# Patient Record
Sex: FEMALE | Race: WHITE | URBAN - METROPOLITAN AREA
[De-identification: names, ages, dates, MRNs, and addresses within clinical notes are randomized per-mention and may not be internally consistent; named-entity substitution may affect disease eponyms.]

---
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
## 2020-02-14 PROBLEM — Z78.9 BREASTFEEDING (INFANT): Status: RESOLVED | Noted: 2018-06-25 | Resolved: 2020-02-14

## 2020-02-14 PROBLEM — Z97.5 IUD (INTRAUTERINE DEVICE) IN PLACE: Status: ACTIVE | Noted: 2020-02-14

## 2020-02-14 NOTE — PROGRESS NOTES
[address]  PROGRESS NOTE  Chief Complaint:   Patient presents with: Follow - Up: Medication      HPI:   This is a 21year old female coming in for follow-up on her medication.   She ran out of SeroLoot!l in May of last year and has not ta given: Not Answered       REVIEW OF SYSTEMS:   CONSTITUTIONAL: See HPI. She is not breast-feeding now. She does have an IUD in place. EENT:  Eyes:  Denies eye pain, visual loss, blurred vision, double vision or yellow sclerae.  Ears, Nose, Throat:  Mah[place] Para Supple, no CLAD, no JVD, no thyromegaly. SKIN: No rashes, no skin lesion, no bruising, good turgor. HEART:  Regular rate and rhythm, no murmurs, rubs or gallops. LUNGS: Clear to auscultation bilterally, no rales/rhonchi/wheezing.     ASSESSMENT AND PLAN:

## 2021-02-01 ENCOUNTER — TELEPHONE (OUTPATIENT)
Dept: FAMILY MEDICINE CLINIC | Facility: CLINIC | Age: 21
End: 2021-02-01

## 2021-02-01 NOTE — TELEPHONE ENCOUNTER
Ts-Yes to runny nose. No other symptoms or contact. Appt ok.     Your appointments     Date & Time Appointment Department [facility])    Feb 02, 2021  3:00 PM CST Exam - Established with Shanon Sanchez MD [address] [place] [address] [facility]

## 2021-02-02 NOTE — PROGRESS NOTES
[facility] [place]  PROGRESS NOTE  Chief Complaint:   Patient presents with:  Depression: medication is not helping with depression, depression has increased since last visit      HPI:   This is a 24year old female coming in for depression.   Sh history. Allergies:  No Known Allergies  Current Meds:  Current Outpatient Medications   Medication Sig Dispense Refill   • divalproex Sodium  MG Oral Tablet 24 Hr Take 1 tablet (250 mg total) by mouth 2 (two) times daily.  60 tablet 5      Counselin groomed. Physical Exam:  GEN:  Patient is alert, awake and oriented, well developed, well nourished, no apparent distress.   HEENT:  Head:  Normocephalic, atraumatic Eyes: EOMI, PERRLA, no scleral icterus, conjunctivae clear bilaterally, no eye discharge E understanding. Patient is notified to call with any questions, complications, allergies, or worsening or changing symptoms. Patient is to call with any side effects or complications from the treatments as a result of today.      Problem List:  Patient Acti

## 2021-04-26 ENCOUNTER — TELEPHONE (OUTPATIENT)
Dept: FAMILY MEDICINE CLINIC | Facility: CLINIC | Age: 21
End: 2021-04-26

## 2021-04-26 NOTE — TELEPHONE ENCOUNTER
Patient states she has already made an appointment at   Urgent Care. She will have her finger looked at there.   Torrie Hart, 04/26/21, 9:19 AM

## 2021-04-26 NOTE — TELEPHONE ENCOUNTER
pt has fake nails on, one of them bent back when she was doing something and it ripped off her own nail- still has some of it on but is afraid to take the rest off - wants to know if  can take care of this

## 2021-08-25 ENCOUNTER — LAB ENCOUNTER (OUTPATIENT)
Dept: LAB | Age: 21
End: 2021-08-25
Attending: NURSE PRACTITIONER
Payer: COMMERCIAL

## 2021-08-25 ENCOUNTER — TELEPHONE (OUTPATIENT)
Dept: FAMILY MEDICINE CLINIC | Facility: CLINIC | Age: 21
End: 2021-08-25

## 2021-08-25 ENCOUNTER — OFFICE VISIT (OUTPATIENT)
Dept: FAMILY MEDICINE CLINIC | Facility: CLINIC | Age: 21
End: 2021-08-25
Payer: COMMERCIAL

## 2021-08-25 VITALS
WEIGHT: 164.81 LBS | DIASTOLIC BLOOD PRESSURE: 80 MMHG | HEIGHT: 64.5 IN | SYSTOLIC BLOOD PRESSURE: 124 MMHG | BODY MASS INDEX: 27.8 KG/M2 | RESPIRATION RATE: 16 BRPM | TEMPERATURE: 98 F | OXYGEN SATURATION: 98 % | HEART RATE: 65 BPM

## 2021-08-25 DIAGNOSIS — R10.32 LEFT LOWER QUADRANT PAIN: ICD-10-CM

## 2021-08-25 DIAGNOSIS — R30.0 BURNING WITH URINATION: Primary | ICD-10-CM

## 2021-08-25 DIAGNOSIS — N92.6 ABNORMAL MENSES: ICD-10-CM

## 2021-08-25 PROBLEM — Z97.5 IUD (INTRAUTERINE DEVICE) IN PLACE: Status: RESOLVED | Noted: 2020-02-14 | Resolved: 2021-08-25

## 2021-08-25 LAB
ALBUMIN SERPL-MCNC: 4.2 G/DL (ref 3.4–5)
ALBUMIN/GLOB SERPL: 1.2 {RATIO} (ref 1–2)
ALP LIVER SERPL-CCNC: 55 U/L
ALT SERPL-CCNC: 23 U/L
ANION GAP SERPL CALC-SCNC: 7 MMOL/L (ref 0–18)
APPEARANCE: CLEAR
AST SERPL-CCNC: 12 U/L (ref 15–37)
BASOPHILS # BLD AUTO: 0.04 X10(3) UL (ref 0–0.2)
BASOPHILS NFR BLD AUTO: 0.7 %
BILIRUB SERPL-MCNC: 0.6 MG/DL (ref 0.1–2)
BILIRUB UR QL STRIP.AUTO: NEGATIVE
BILIRUBIN: NEGATIVE
BUN BLD-MCNC: 9 MG/DL (ref 7–18)
CALCIUM BLD-MCNC: 8.9 MG/DL (ref 8.5–10.1)
CHLORIDE SERPL-SCNC: 106 MMOL/L (ref 98–112)
CLARITY UR REFRACT.AUTO: CLEAR
CO2 SERPL-SCNC: 25 MMOL/L (ref 21–32)
COLOR UR AUTO: YELLOW
CREAT BLD-MCNC: 0.61 MG/DL
CUVETTE LOT #: NORMAL NUMERIC
EOSINOPHIL # BLD AUTO: 0.13 X10(3) UL (ref 0–0.7)
EOSINOPHIL NFR BLD AUTO: 2.3 %
ERYTHROCYTE [DISTWIDTH] IN BLOOD BY AUTOMATED COUNT: 14 %
GLOBULIN PLAS-MCNC: 3.4 G/DL (ref 2.8–4.4)
GLUCOSE (URINE DIPSTICK): NEGATIVE MG/DL
GLUCOSE BLD-MCNC: 75 MG/DL (ref 70–99)
GLUCOSE UR STRIP.AUTO-MCNC: NEGATIVE MG/DL
HCT VFR BLD AUTO: 43.9 %
HEMOGLOBIN: 14 G/DL (ref 12–15)
HGB BLD-MCNC: 14.1 G/DL
IMM GRANULOCYTES # BLD AUTO: 0.01 X10(3) UL (ref 0–1)
IMM GRANULOCYTES NFR BLD: 0.2 %
KETONES (URINE DIPSTICK): NEGATIVE MG/DL
KETONES UR STRIP.AUTO-MCNC: NEGATIVE MG/DL
LEUKOCYTE ESTERASE UR QL STRIP.AUTO: NEGATIVE
LEUKOCYTES: NEGATIVE
LYMPHOCYTES # BLD AUTO: 1.89 X10(3) UL (ref 1–4)
LYMPHOCYTES NFR BLD AUTO: 33.7 %
M PROTEIN MFR SERPL ELPH: 7.6 G/DL (ref 6.4–8.2)
MCH RBC QN AUTO: 29.4 PG (ref 26–34)
MCHC RBC AUTO-ENTMCNC: 32.1 G/DL (ref 31–37)
MCV RBC AUTO: 91.5 FL
MONOCYTES # BLD AUTO: 0.36 X10(3) UL (ref 0.1–1)
MONOCYTES NFR BLD AUTO: 6.4 %
MULTISTIX LOT#: 5077 NUMERIC
NEUTROPHILS # BLD AUTO: 3.18 X10 (3) UL (ref 1.5–7.7)
NEUTROPHILS # BLD AUTO: 3.18 X10(3) UL (ref 1.5–7.7)
NEUTROPHILS NFR BLD AUTO: 56.7 %
NITRITE UR QL STRIP.AUTO: NEGATIVE
NITRITE, URINE: NEGATIVE
OSMOLALITY SERPL CALC.SUM OF ELEC: 283 MOSM/KG (ref 275–295)
PATIENT FASTING Y/N/NP: NO
PH UR STRIP.AUTO: 8 [PH] (ref 5–8)
PH, URINE: 8.5 (ref 4.5–8)
PLATELET # BLD AUTO: 280 10(3)UL (ref 150–450)
POTASSIUM SERPL-SCNC: 3.6 MMOL/L (ref 3.5–5.1)
PROT UR STRIP.AUTO-MCNC: NEGATIVE MG/DL
PROTEIN (URINE DIPSTICK): NEGATIVE MG/DL
RBC # BLD AUTO: 4.8 X10(6)UL
SODIUM SERPL-SCNC: 138 MMOL/L (ref 136–145)
SP GR UR STRIP.AUTO: 1.02 (ref 1–1.03)
SPECIFIC GRAVITY: 1.02 (ref 1–1.03)
URINE-COLOR: YELLOW
UROBILINOGEN UR STRIP.AUTO-MCNC: <2 MG/DL
UROBILINOGEN,SEMI-QN: 0.2 MG/DL (ref 0–1.9)
WBC # BLD AUTO: 5.6 X10(3) UL (ref 4–11)

## 2021-08-25 PROCEDURE — 3079F DIAST BP 80-89 MM HG: CPT | Performed by: NURSE PRACTITIONER

## 2021-08-25 PROCEDURE — 99214 OFFICE O/P EST MOD 30 MIN: CPT | Performed by: NURSE PRACTITIONER

## 2021-08-25 PROCEDURE — 3008F BODY MASS INDEX DOCD: CPT | Performed by: NURSE PRACTITIONER

## 2021-08-25 PROCEDURE — 36415 COLL VENOUS BLD VENIPUNCTURE: CPT | Performed by: NURSE PRACTITIONER

## 2021-08-25 PROCEDURE — 81003 URINALYSIS AUTO W/O SCOPE: CPT | Performed by: NURSE PRACTITIONER

## 2021-08-25 PROCEDURE — 3074F SYST BP LT 130 MM HG: CPT | Performed by: NURSE PRACTITIONER

## 2021-08-25 PROCEDURE — 85025 COMPLETE CBC W/AUTO DIFF WBC: CPT | Performed by: NURSE PRACTITIONER

## 2021-08-25 PROCEDURE — 85018 HEMOGLOBIN: CPT | Performed by: NURSE PRACTITIONER

## 2021-08-25 PROCEDURE — 80053 COMPREHEN METABOLIC PANEL: CPT | Performed by: NURSE PRACTITIONER

## 2021-08-25 PROCEDURE — 81001 URINALYSIS AUTO W/SCOPE: CPT | Performed by: NURSE PRACTITIONER

## 2021-08-25 RX ORDER — ETONOGESTREL AND ETHINYL ESTRADIOL 11.7; 2.7 MG/1; MG/1
1 INSERT, EXTENDED RELEASE VAGINAL
COMMUNITY

## 2021-08-25 RX ORDER — AMOXICILLIN AND CLAVULANATE POTASSIUM 875; 125 MG/1; MG/1
1 TABLET, FILM COATED ORAL 2 TIMES DAILY
Qty: 20 TABLET | Refills: 0 | Status: SHIPPED | OUTPATIENT
Start: 2021-08-25 | End: 2021-09-04

## 2021-08-25 NOTE — PATIENT INSTRUCTIONS
Labs and urine today. Clear liquid diet for the next 24 hours including broth-based soups, Pedialyte, Pedialyte popsicles, water. Avoid spicy or caffeinated drinks.   Start Augmentin 1 tablet twice a day for 10 days, will empirically cover for UTI as we

## 2021-08-25 NOTE — TELEPHONE ENCOUNTER
Patient c/o L side abd pain primarily but does travel towards the middle at times. States the pain started in the night and woke her up. Patient c/o the pain being a 8/10 right now. Heal drop does make it a little worse but not a lot.   Patient also c/o

## 2021-08-25 NOTE — TELEPHONE ENCOUNTER
----- Message from ZACARIAS Fisher sent at 8/25/2021  5:40 PM CDT -----  Patient's urine specimen negative for UTI, has some small blood in the urine though that can be secondary to having menstrual cycle.    Labs stable otherwise not suggestive of a

## 2021-08-25 NOTE — PROGRESS NOTES
[facility] [place]  PROGRESS NOTE  Chief Complaint:   Patient presents with:  Abdominal Pain: recently just started/cramps   Diarrhea: has been going on for 3 days   Nausea  UTI: burning while urination.        HPI:   This is a 24year old female (two) times daily for 10 days. 20 tablet 0      Counseling given: Not Answered       REVIEW OF SYSTEMS:   CONSTITUTIONAL:  Denies unusual weight gain/loss, fever, chills, or fatigue.   EENT:  Eyes:  Denies eye pain, visual loss, blurred vision, double visio 0.37)*  03/01/19 : 141 lb (64 kg) (73 %, Z= 0.60)*  06/25/18 : 163 lb 4 oz (74 kg) (91 %, Z= 1.31)*    * Growth percentiles are based on CDC (Girls, 2-20 Years) data. Vital signs reviewed. Appears stated age, well groomed.   Physical Exam:  GEN:  Patient HEMOGLOBIN  - CBC WITH DIFFERENTIAL WITH PLATELET  - COMP METABOLIC PANEL (14)    3. Left lower quadrant pain  Similar symptoms to episode approximately 1-2 years ago per patient, improved with augmentin.   Consider diverticulitis as differential with loose Never done  COVID-19 Vaccine(1) Never done  Hepatitis A Vaccines(2 of 2 - 2-dose series) due on 02/27/2015  Pap Smear,3 Years Never done  DTaP,Tdap,and Td Vaccines(7 - Td or Tdap) due on 08/12/2021      Problem List:  Patient Active Problem List:     Abner Cabello

## 2021-08-26 ENCOUNTER — TELEPHONE (OUTPATIENT)
Dept: FAMILY MEDICINE CLINIC | Facility: CLINIC | Age: 21
End: 2021-08-26

## 2021-08-26 NOTE — TELEPHONE ENCOUNTER
Pt called and states pain is not better and nausea worsening. Feels the pain gets worse walking. States she would like to speak to [facility] regarding this asap. Please c/b.

## 2021-08-26 NOTE — TELEPHONE ENCOUNTER
Patient states she is currently having the pain in her Lower Left side underneath her stomach. States the pain is worse with standing or activities. Patient rates the pain a 7/10 right now. States at times she is almost in tears.  Patient states she has

## 2021-10-30 ENCOUNTER — OFFICE VISIT (OUTPATIENT)
Dept: FAMILY MEDICINE CLINIC | Facility: CLINIC | Age: 21
End: 2021-10-30
Payer: COMMERCIAL

## 2021-10-30 ENCOUNTER — TELEPHONE (OUTPATIENT)
Dept: FAMILY MEDICINE CLINIC | Facility: CLINIC | Age: 21
End: 2021-10-30

## 2021-10-30 VITALS
OXYGEN SATURATION: 98 % | WEIGHT: 177 LBS | HEIGHT: 64.5 IN | HEART RATE: 75 BPM | BODY MASS INDEX: 29.85 KG/M2 | TEMPERATURE: 98 F | RESPIRATION RATE: 18 BRPM | DIASTOLIC BLOOD PRESSURE: 82 MMHG | SYSTOLIC BLOOD PRESSURE: 104 MMHG

## 2021-10-30 DIAGNOSIS — J06.9 UPPER RESPIRATORY TRACT INFECTION, UNSPECIFIED TYPE: ICD-10-CM

## 2021-10-30 DIAGNOSIS — J02.0 PHARYNGITIS, STREPTOCOCCAL, ACUTE: ICD-10-CM

## 2021-10-30 DIAGNOSIS — J02.9 SORE THROAT: Primary | ICD-10-CM

## 2021-10-30 PROCEDURE — 3008F BODY MASS INDEX DOCD: CPT | Performed by: NURSE PRACTITIONER

## 2021-10-30 PROCEDURE — 99214 OFFICE O/P EST MOD 30 MIN: CPT | Performed by: NURSE PRACTITIONER

## 2021-10-30 PROCEDURE — 3079F DIAST BP 80-89 MM HG: CPT | Performed by: NURSE PRACTITIONER

## 2021-10-30 PROCEDURE — 3074F SYST BP LT 130 MM HG: CPT | Performed by: NURSE PRACTITIONER

## 2021-10-30 RX ORDER — PENICILLIN V POTASSIUM 500 MG/1
500 TABLET ORAL 2 TIMES DAILY
COMMUNITY
Start: 2021-10-28 | End: 2021-10-30

## 2021-10-30 RX ORDER — CEPHALEXIN 500 MG/1
500 CAPSULE ORAL 2 TIMES DAILY
Qty: 20 CAPSULE | Refills: 0 | Status: SHIPPED | OUTPATIENT
Start: 2021-10-30 | End: 2021-11-09

## 2021-10-30 RX ORDER — AZELASTINE 1 MG/ML
2 SPRAY, METERED NASAL 2 TIMES DAILY
COMMUNITY
Start: 2021-10-28

## 2021-10-30 NOTE — PATIENT INSTRUCTIONS
Stop Penicillin   Start cephalexin 500mg twice a day for ten days; take with food  Avoid dairy products, push clear liquids  Okay for sudafed, side effects   Steam showers  Humidifier by bedside  Lukewarm salt water gargles four times a day  Ibuprofen 800m

## 2021-10-30 NOTE — TELEPHONE ENCOUNTER
Re:  went to ER 2 days ago - dx with Strep  - COVID was negative. Still not feeling good.    Please advise

## 2021-10-30 NOTE — PROGRESS NOTES
CHIEF COMPLAINT:   Patient presents with:  ER F/U: dx with strep but its getting worse      HPI:   Nora Yanez is a 24year old female who presents to clinic today with complaints of strep follow up.     Patient was in ER, diagnosed with strep and Resp 18   Ht 5' 4.5\" (1.638 m)   Wt 177 lb (80.3 kg)   LMP 08/19/2021 (Exact Date)   SpO2 98%   BMI 29.91 kg/m²   Wt Readings from Last 6 Encounters:  10/30/21 : 177 lb (80.3 kg)  08/25/21 : 164 lb 12.8 oz (74.8 kg)  02/02/21 : 170 lb (77.1 kg)  02/14/2 not improve or if symptoms worsen. Risk and benefits of medication discussed. Stressed importance of completing full course of antibiotic.      Patient Instructions   Stop Penicillin   Start cephalexin 500mg twice a day for ten days; take with food  [place]

## 2021-10-30 NOTE — TELEPHONE ENCOUNTER
Spoke with patient. States she was seen in the ER 2 days ago and diagnosed with Strep. Covid was negative. States she was prescribed an antibiotic. Patient states this morning her throat feels worse and she is having abd discomfort with diarrhea.

## 2021-11-26 ENCOUNTER — TELEPHONE (OUTPATIENT)
Dept: FAMILY MEDICINE CLINIC | Facility: CLINIC | Age: 21
End: 2021-11-26

## 2021-11-26 NOTE — TELEPHONE ENCOUNTER
Symptoms:   having back pain & chest pain  - itchy  - son dx with head foot & mouth -  please advise

## 2021-11-26 NOTE — TELEPHONE ENCOUNTER
Patient c/o back pain since having her son 3 years ago. States every day it seems to get worse. Patient unsure if the pain was originally caused by the epidermal she had. States today she is in a lot of pain.        Patient also c/o nasal congestion and

## 2022-03-04 ENCOUNTER — OFFICE VISIT (OUTPATIENT)
Dept: FAMILY MEDICINE CLINIC | Facility: CLINIC | Age: 22
End: 2022-03-04
Payer: COMMERCIAL

## 2022-03-04 VITALS
RESPIRATION RATE: 18 BRPM | HEART RATE: 81 BPM | BODY MASS INDEX: 27.66 KG/M2 | OXYGEN SATURATION: 96 % | TEMPERATURE: 98 F | SYSTOLIC BLOOD PRESSURE: 100 MMHG | WEIGHT: 164 LBS | HEIGHT: 64.5 IN | DIASTOLIC BLOOD PRESSURE: 80 MMHG

## 2022-03-04 DIAGNOSIS — J31.0 RHINITIS, UNSPECIFIED TYPE: ICD-10-CM

## 2022-03-04 DIAGNOSIS — R06.00 DYSPNEA, UNSPECIFIED TYPE: Primary | ICD-10-CM

## 2022-03-04 PROBLEM — R10.32 LEFT LOWER QUADRANT PAIN: Status: RESOLVED | Noted: 2019-04-05 | Resolved: 2022-03-04

## 2022-03-04 PROCEDURE — 99214 OFFICE O/P EST MOD 30 MIN: CPT | Performed by: NURSE PRACTITIONER

## 2022-03-04 PROCEDURE — 3074F SYST BP LT 130 MM HG: CPT | Performed by: NURSE PRACTITIONER

## 2022-03-04 PROCEDURE — 3008F BODY MASS INDEX DOCD: CPT | Performed by: NURSE PRACTITIONER

## 2022-03-04 PROCEDURE — 3079F DIAST BP 80-89 MM HG: CPT | Performed by: NURSE PRACTITIONER

## 2022-03-04 RX ORDER — FLUTICASONE PROPIONATE 50 MCG
1 SPRAY, SUSPENSION (ML) NASAL DAILY
COMMUNITY

## 2022-03-04 RX ORDER — CETIRIZINE HYDROCHLORIDE 10 MG/1
10 TABLET ORAL NIGHTLY
COMMUNITY

## 2022-03-04 NOTE — PATIENT INSTRUCTIONS
Okay to continue to use albuterol if needed. Trial of Zyrtec 10mg nightly (antihistamine)  Nasal fluticasone (Flonase) two sprays each nostril once a day for a week then one spray each nostril daily  Use these consistently for one month.   Deep clean/dust/vaccuum home; keep air purifier by bedside if able  Keep cat out of bedroom, consider of trial of cat out of the home if able  Notify office if no significant improvement or worsening

## 2022-03-30 ENCOUNTER — TELEMEDICINE (OUTPATIENT)
Dept: FAMILY MEDICINE CLINIC | Facility: CLINIC | Age: 22
End: 2022-03-30
Payer: COMMERCIAL

## 2022-03-30 VITALS — HEIGHT: 64.5 IN | BODY MASS INDEX: 26.98 KG/M2 | WEIGHT: 160 LBS

## 2022-03-30 DIAGNOSIS — R68.89 FLU-LIKE SYMPTOMS: Primary | ICD-10-CM

## 2022-03-30 PROCEDURE — 3008F BODY MASS INDEX DOCD: CPT | Performed by: NURSE PRACTITIONER

## 2022-03-30 PROCEDURE — 99213 OFFICE O/P EST LOW 20 MIN: CPT | Performed by: NURSE PRACTITIONER

## 2022-03-30 RX ORDER — ALBUTEROL SULFATE 90 UG/1
AEROSOL, METERED RESPIRATORY (INHALATION)
Qty: 1 EACH | Refills: 0 | Status: SHIPPED | OUTPATIENT
Start: 2022-03-30

## 2022-03-30 NOTE — PATIENT INSTRUCTIONS
Covid, flu, rsv swab through nw tent  Refill of albuterol, use every 4 hours while awake today then use if needed for cough  Push fluids, rest  Steam showers  Get thermometer, check temperature  Can alternate acetaminophen and ibuprofen for fever, body aches; do not go over 4,000mg of acetaminophen each day between the tablets and Nyquil/DayQuil  Self isolate until results available  ER precautions for severe symptoms

## 2022-03-31 ENCOUNTER — TELEPHONE (OUTPATIENT)
Dept: FAMILY MEDICINE CLINIC | Facility: CLINIC | Age: 22
End: 2022-03-31

## 2022-03-31 NOTE — TELEPHONE ENCOUNTER
Please notify the patient, her influenza came back positive for influenza A. She is negative for Covid, influenza B, RSV. She needs to continue symptomatic care as reviewed yesterday including her inhaler. She needs a thermometer to monitor her temperature. Increase fluids, rest, steam showers. She needs to monitor her temperature, cannot return to work until 24 hours after she is fever free without the use of fever reducing medications, can still potential to be contagious for 5 to 7 days from the onset of symptoms and should still wear a mask and wash hands frequently. ER precautions for severe symptoms.

## 2022-03-31 NOTE — TELEPHONE ENCOUNTER
Patient had video visit yesterday and was instructed to call back with test results /  Covid test was negative and Influenza A was positive at [facility] yesterday.

## 2022-03-31 NOTE — TELEPHONE ENCOUNTER
Fax received from [facility] with results from 3/30/2022. Influenza A came back Positive. Covid, Influenza B, and RSV came back Negative.

## 2022-04-21 RX ORDER — ALBUTEROL SULFATE 90 UG/1
AEROSOL, METERED RESPIRATORY (INHALATION)
Qty: 6.7 EACH | Refills: 0 | Status: SHIPPED | OUTPATIENT
Start: 2022-04-21

## 2022-04-21 NOTE — TELEPHONE ENCOUNTER
Future appt:    Last Appointment with provider:   3/30/2022 for a virtual visit with ZACARIAS Mckenzie    Last appointment at [facility]:  3/4/2022  No results found for: CHOLEST, HDL, LDL, TRIGLY, TRIG  No results found for: EAG, A1C  No results found for: T4F, TSH, TSHT4    Last RF:  3/30/2022    No follow-ups on file.

## (undated) NOTE — LETTER
Date: 10/30/2021    Patient Name: Zoraida Hamm          To Whom it may concern: This letter has been written at the patient's request. The above patient was seen at the [facility] for treatment of a medical condition.     This patient

## (undated) NOTE — LETTER
Date: 8/25/2021    Patient Name: Ramiro Kaur          To Whom it may concern: This letter has been written at the patient's request. The above patient was seen at the [facility] for treatment of a medical condition.     This patient